# Patient Record
Sex: FEMALE | Race: OTHER | Employment: OTHER | URBAN - METROPOLITAN AREA
[De-identification: names, ages, dates, MRNs, and addresses within clinical notes are randomized per-mention and may not be internally consistent; named-entity substitution may affect disease eponyms.]

---

## 2023-01-01 ENCOUNTER — HOSPITAL ENCOUNTER (EMERGENCY)
Facility: HOSPITAL | Age: 88
End: 2023-01-01
Attending: EMERGENCY MEDICINE
Payer: MEDICARE

## 2023-01-01 DIAGNOSIS — I46.9 CARDIOPULMONARY ARREST (HCC): Primary | ICD-10-CM

## 2023-01-01 DIAGNOSIS — Z66 DO NOT RESUSCITATE STATUS WITH SUPPORTING DOCUMENTATION: ICD-10-CM

## 2023-01-01 PROCEDURE — 99285 EMERGENCY DEPT VISIT HI MDM: CPT

## 2023-03-02 NOTE — ED NOTES
Buster  called, states waiting to hear back from PCP before release of the body. RN spoke to Beverley at [facility]. Beverley states that patient not a candidate for organ or cornea donation.      Family in Consultation Room

## 2023-03-02 NOTE — PROGRESS NOTES
03/02/23 1732   Clinical Encounter Type   Visited With Family   Crisis Visit ED; Death   Referral From Nurse   Family Spiritual Encounters   Family Coping Sadness;Open/discussion   Family Participation in [address] Effects [employer] a calming presence   Methods Offer support   Interventions Explain  role; Active listening     Summary:  received call from RN about patient's death.  went with doctor to share the news with daughter in family room.  handed off to night . Spiritual Care support can be requested via an Epic consult.     -Adalid Blair, Chaplain Resident.

## 2023-03-02 NOTE — ED INITIAL ASSESSMENT (HPI)
Patient arrives via [address] [place] [address] Pky for shortness of breath, patient 96% RA sinus martin 50s upon arrival patient hr 29.  Patient arrives pulseless with copy of DNR

## 2023-03-03 NOTE — ED QUICK NOTES
Received report from [employer]. [facility] contacted , spoke with Moy Molina , and informed release of body. Pt will be released to the [facility] conducted at this time.